# Patient Record
Sex: MALE | URBAN - METROPOLITAN AREA
[De-identification: names, ages, dates, MRNs, and addresses within clinical notes are randomized per-mention and may not be internally consistent; named-entity substitution may affect disease eponyms.]

---

## 2019-12-27 ENCOUNTER — RECORDS - HEALTHEAST (OUTPATIENT)
Dept: LAB | Facility: CLINIC | Age: 59
End: 2019-12-27

## 2019-12-27 LAB
ALBUMIN SERPL-MCNC: 3.8 G/DL (ref 3.5–5)
ALP SERPL-CCNC: 95 U/L (ref 45–120)
ALT SERPL W P-5'-P-CCNC: 27 U/L (ref 0–45)
ANION GAP SERPL CALCULATED.3IONS-SCNC: 8 MMOL/L (ref 5–18)
AST SERPL W P-5'-P-CCNC: 21 U/L (ref 0–40)
BASOPHILS # BLD AUTO: 0 THOU/UL (ref 0–0.2)
BASOPHILS NFR BLD AUTO: 1 % (ref 0–2)
BILIRUB SERPL-MCNC: 0.5 MG/DL (ref 0–1)
BUN SERPL-MCNC: 16 MG/DL (ref 8–22)
CALCIUM SERPL-MCNC: 9.3 MG/DL (ref 8.5–10.5)
CHLORIDE BLD-SCNC: 107 MMOL/L (ref 98–107)
CHOLEST SERPL-MCNC: 255 MG/DL
CO2 SERPL-SCNC: 25 MMOL/L (ref 22–31)
CREAT SERPL-MCNC: 0.98 MG/DL (ref 0.7–1.3)
EOSINOPHIL # BLD AUTO: 0.1 THOU/UL (ref 0–0.4)
EOSINOPHIL NFR BLD AUTO: 2 % (ref 0–6)
ERYTHROCYTE [DISTWIDTH] IN BLOOD BY AUTOMATED COUNT: 11.9 % (ref 11–14.5)
FASTING STATUS PATIENT QL REPORTED: ABNORMAL
GFR SERPL CREATININE-BSD FRML MDRD: >60 ML/MIN/1.73M2
GLUCOSE BLD-MCNC: 105 MG/DL (ref 70–125)
HCT VFR BLD AUTO: 50 % (ref 40–54)
HDLC SERPL-MCNC: 49 MG/DL
HGB BLD-MCNC: 16.2 G/DL (ref 14–18)
LDLC SERPL CALC-MCNC: 185 MG/DL
LYMPHOCYTES # BLD AUTO: 1.5 THOU/UL (ref 0.8–4.4)
LYMPHOCYTES NFR BLD AUTO: 30 % (ref 20–40)
MCH RBC QN AUTO: 31.5 PG (ref 27–34)
MCHC RBC AUTO-ENTMCNC: 32.4 G/DL (ref 32–36)
MCV RBC AUTO: 97 FL (ref 80–100)
MONOCYTES # BLD AUTO: 0.6 THOU/UL (ref 0–0.9)
MONOCYTES NFR BLD AUTO: 11 % (ref 2–10)
NEUTROPHILS # BLD AUTO: 2.8 THOU/UL (ref 2–7.7)
NEUTROPHILS NFR BLD AUTO: 56 % (ref 50–70)
PLATELET # BLD AUTO: 187 THOU/UL (ref 140–440)
PMV BLD AUTO: 11.2 FL (ref 8.5–12.5)
POTASSIUM BLD-SCNC: 4.4 MMOL/L (ref 3.5–5)
PROT SERPL-MCNC: 6.7 G/DL (ref 6–8)
RBC # BLD AUTO: 5.15 MILL/UL (ref 4.4–6.2)
SODIUM SERPL-SCNC: 140 MMOL/L (ref 136–145)
TRIGL SERPL-MCNC: 105 MG/DL
WBC: 5 THOU/UL (ref 4–11)

## 2020-09-15 ENCOUNTER — RECORDS - HEALTHEAST (OUTPATIENT)
Dept: LAB | Facility: CLINIC | Age: 60
End: 2020-09-15

## 2020-09-15 LAB
ALBUMIN SERPL-MCNC: 3.9 G/DL (ref 3.5–5)
ALP SERPL-CCNC: 85 U/L (ref 45–120)
ALT SERPL W P-5'-P-CCNC: 20 U/L (ref 0–45)
ANION GAP SERPL CALCULATED.3IONS-SCNC: 9 MMOL/L (ref 5–18)
AST SERPL W P-5'-P-CCNC: 16 U/L (ref 0–40)
BASOPHILS # BLD AUTO: 0 THOU/UL (ref 0–0.2)
BASOPHILS NFR BLD AUTO: 1 % (ref 0–2)
BILIRUB SERPL-MCNC: 0.4 MG/DL (ref 0–1)
BUN SERPL-MCNC: 17 MG/DL (ref 8–22)
CALCIUM SERPL-MCNC: 9.1 MG/DL (ref 8.5–10.5)
CHLORIDE BLD-SCNC: 107 MMOL/L (ref 98–107)
CHOLEST SERPL-MCNC: 215 MG/DL
CO2 SERPL-SCNC: 25 MMOL/L (ref 22–31)
CREAT SERPL-MCNC: 0.99 MG/DL (ref 0.7–1.3)
EOSINOPHIL # BLD AUTO: 0 THOU/UL (ref 0–0.4)
EOSINOPHIL NFR BLD AUTO: 1 % (ref 0–6)
ERYTHROCYTE [DISTWIDTH] IN BLOOD BY AUTOMATED COUNT: 11.9 % (ref 11–14.5)
FASTING STATUS PATIENT QL REPORTED: ABNORMAL
GFR SERPL CREATININE-BSD FRML MDRD: >60 ML/MIN/1.73M2
GLUCOSE BLD-MCNC: 99 MG/DL (ref 70–125)
HCT VFR BLD AUTO: 47.2 % (ref 40–54)
HDLC SERPL-MCNC: 47 MG/DL
HGB BLD-MCNC: 15.3 G/DL (ref 14–18)
IMM GRANULOCYTES # BLD: 0 THOU/UL
IMM GRANULOCYTES NFR BLD: 0 %
LDLC SERPL CALC-MCNC: 150 MG/DL
LYMPHOCYTES # BLD AUTO: 1.2 THOU/UL (ref 0.8–4.4)
LYMPHOCYTES NFR BLD AUTO: 30 % (ref 20–40)
MCH RBC QN AUTO: 31.7 PG (ref 27–34)
MCHC RBC AUTO-ENTMCNC: 32.4 G/DL (ref 32–36)
MCV RBC AUTO: 98 FL (ref 80–100)
MONOCYTES # BLD AUTO: 0.3 THOU/UL (ref 0–0.9)
MONOCYTES NFR BLD AUTO: 8 % (ref 2–10)
NEUTROPHILS # BLD AUTO: 2.5 THOU/UL (ref 2–7.7)
NEUTROPHILS NFR BLD AUTO: 61 % (ref 50–70)
PLATELET # BLD AUTO: 239 THOU/UL (ref 140–440)
PMV BLD AUTO: 11 FL (ref 8.5–12.5)
POTASSIUM BLD-SCNC: 4.2 MMOL/L (ref 3.5–5)
PROT SERPL-MCNC: 6.7 G/DL (ref 6–8)
RBC # BLD AUTO: 4.82 MILL/UL (ref 4.4–6.2)
SODIUM SERPL-SCNC: 141 MMOL/L (ref 136–145)
TRIGL SERPL-MCNC: 91 MG/DL
WBC: 4 THOU/UL (ref 4–11)

## 2023-03-23 ENCOUNTER — LAB REQUISITION (OUTPATIENT)
Dept: LAB | Facility: CLINIC | Age: 63
End: 2023-03-23

## 2023-03-23 DIAGNOSIS — Z13.220 ENCOUNTER FOR SCREENING FOR LIPOID DISORDERS: ICD-10-CM

## 2023-03-23 DIAGNOSIS — Z12.5 ENCOUNTER FOR SCREENING FOR MALIGNANT NEOPLASM OF PROSTATE: ICD-10-CM

## 2023-03-23 DIAGNOSIS — Z13.29 ENCOUNTER FOR SCREENING FOR OTHER SUSPECTED ENDOCRINE DISORDER: ICD-10-CM

## 2023-03-23 DIAGNOSIS — Z13.1 ENCOUNTER FOR SCREENING FOR DIABETES MELLITUS: ICD-10-CM

## 2023-03-23 LAB
ALBUMIN SERPL BCG-MCNC: 4.6 G/DL (ref 3.5–5.2)
ALP SERPL-CCNC: 87 U/L (ref 40–129)
ALT SERPL W P-5'-P-CCNC: 31 U/L (ref 10–50)
ANION GAP SERPL CALCULATED.3IONS-SCNC: 13 MMOL/L (ref 7–15)
AST SERPL W P-5'-P-CCNC: 24 U/L (ref 10–50)
BILIRUB SERPL-MCNC: 0.4 MG/DL
BUN SERPL-MCNC: 19.3 MG/DL (ref 8–23)
CALCIUM SERPL-MCNC: 9.4 MG/DL (ref 8.8–10.2)
CHLORIDE SERPL-SCNC: 104 MMOL/L (ref 98–107)
CHOLEST SERPL-MCNC: 240 MG/DL
CREAT SERPL-MCNC: 0.89 MG/DL (ref 0.67–1.17)
DEPRECATED HCO3 PLAS-SCNC: 23 MMOL/L (ref 22–29)
GFR SERPL CREATININE-BSD FRML MDRD: >90 ML/MIN/1.73M2
GLUCOSE SERPL-MCNC: 97 MG/DL (ref 70–99)
HDLC SERPL-MCNC: 64 MG/DL
LDLC SERPL CALC-MCNC: 161 MG/DL
NONHDLC SERPL-MCNC: 176 MG/DL
POTASSIUM SERPL-SCNC: 4.5 MMOL/L (ref 3.4–5.3)
PROT SERPL-MCNC: 7.1 G/DL (ref 6.4–8.3)
PSA SERPL DL<=0.01 NG/ML-MCNC: 5.04 NG/ML (ref 0–4.5)
SODIUM SERPL-SCNC: 140 MMOL/L (ref 136–145)
TRIGL SERPL-MCNC: 75 MG/DL
TSH SERPL DL<=0.005 MIU/L-ACNC: 1.74 UIU/ML (ref 0.3–4.2)

## 2023-03-23 PROCEDURE — 80053 COMPREHEN METABOLIC PANEL: CPT | Performed by: FAMILY MEDICINE

## 2023-03-23 PROCEDURE — 84443 ASSAY THYROID STIM HORMONE: CPT | Performed by: FAMILY MEDICINE

## 2023-03-23 PROCEDURE — G0103 PSA SCREENING: HCPCS | Performed by: FAMILY MEDICINE

## 2023-03-23 PROCEDURE — 80061 LIPID PANEL: CPT | Performed by: FAMILY MEDICINE

## 2023-11-17 ENCOUNTER — LAB REQUISITION (OUTPATIENT)
Dept: LAB | Facility: CLINIC | Age: 63
End: 2023-11-17

## 2023-11-17 DIAGNOSIS — R31.29 OTHER MICROSCOPIC HEMATURIA: ICD-10-CM

## 2023-11-17 LAB
ALBUMIN UR-MCNC: NEGATIVE MG/DL
APPEARANCE UR: CLEAR
BILIRUB UR QL STRIP: NEGATIVE
COLOR UR AUTO: ABNORMAL
GLUCOSE UR STRIP-MCNC: NEGATIVE MG/DL
HGB UR QL STRIP: NEGATIVE
KETONES UR STRIP-MCNC: NEGATIVE MG/DL
LEUKOCYTE ESTERASE UR QL STRIP: NEGATIVE
MUCOUS THREADS #/AREA URNS LPF: PRESENT /LPF
NITRATE UR QL: NEGATIVE
PH UR STRIP: 7 [PH] (ref 5–7)
RBC URINE: 0 /HPF
SP GR UR STRIP: 1.02 (ref 1–1.03)
UROBILINOGEN UR STRIP-MCNC: NORMAL MG/DL
WBC URINE: 0 /HPF

## 2023-11-17 PROCEDURE — 81001 URINALYSIS AUTO W/SCOPE: CPT | Performed by: FAMILY MEDICINE

## 2024-09-19 ENCOUNTER — TRANSFERRED RECORDS (OUTPATIENT)
Dept: HEALTH INFORMATION MANAGEMENT | Facility: CLINIC | Age: 64
End: 2024-09-19

## 2025-01-31 ENCOUNTER — MEDICAL CORRESPONDENCE (OUTPATIENT)
Dept: HEALTH INFORMATION MANAGEMENT | Facility: CLINIC | Age: 65
End: 2025-01-31
Payer: COMMERCIAL

## 2025-01-31 ENCOUNTER — TRANSFERRED RECORDS (OUTPATIENT)
Dept: HEALTH INFORMATION MANAGEMENT | Facility: CLINIC | Age: 65
End: 2025-01-31
Payer: COMMERCIAL

## 2025-02-03 ENCOUNTER — TELEPHONE (OUTPATIENT)
Dept: VASCULAR SURGERY | Facility: CLINIC | Age: 65
End: 2025-02-03
Payer: COMMERCIAL

## 2025-02-04 NOTE — TELEPHONE ENCOUNTER
Vascular Referral Intake    Appointment note (to be pasted into appt note. Also add where additional info is located ie: outside images pushed to PACS, in Epic, sent to HIM, etc):   Referred by Dr Troncoso for Varicose Veins    Specialty: Vein Clinic    Specific Provider if Necessary:  MD Emigdio Mckinnon    Visit Type: New    Time Frame: Next Available    Testing/Imaging Needed Before Consult: none    Elvia or bed needed: No    *Schedulers: Please send welcome letter to patient after appointment(s) have been scheduled*

## 2025-02-05 ENCOUNTER — ANCILLARY PROCEDURE (OUTPATIENT)
Dept: VASCULAR ULTRASOUND | Facility: CLINIC | Age: 65
End: 2025-02-05
Attending: SPECIALIST
Payer: COMMERCIAL

## 2025-02-05 ENCOUNTER — OFFICE VISIT (OUTPATIENT)
Dept: VASCULAR SURGERY | Facility: CLINIC | Age: 65
End: 2025-02-05
Attending: FAMILY MEDICINE
Payer: COMMERCIAL

## 2025-02-05 VITALS — OXYGEN SATURATION: 100 % | DIASTOLIC BLOOD PRESSURE: 80 MMHG | HEART RATE: 63 BPM | SYSTOLIC BLOOD PRESSURE: 180 MMHG

## 2025-02-05 DIAGNOSIS — I83.893 SYMPTOMATIC VARICOSE VEINS OF BOTH LOWER EXTREMITIES: ICD-10-CM

## 2025-02-05 PROCEDURE — 93970 EXTREMITY STUDY: CPT

## 2025-02-05 PROCEDURE — 99243 OFF/OP CNSLTJ NEW/EST LOW 30: CPT | Performed by: SPECIALIST

## 2025-02-05 PROCEDURE — G0463 HOSPITAL OUTPT CLINIC VISIT: HCPCS | Performed by: SPECIALIST

## 2025-02-05 RX ORDER — CELECOXIB 200 MG/1
200 CAPSULE ORAL 2 TIMES DAILY
COMMUNITY
Start: 2024-09-09

## 2025-02-05 RX ORDER — TADALAFIL 5 MG/1
1 TABLET ORAL DAILY
COMMUNITY

## 2025-02-05 ASSESSMENT — PAIN SCALES - GENERAL: PAINLEVEL_OUTOF10: NO PAIN (0)

## 2025-02-05 NOTE — PATIENT INSTRUCTIONS
Faisal    Thank you for entrusting your care with us at the [facility].      We are prescribing some compression stockings for you. I have included different suppliers that should help you get measured and fitting to ensure proper fitting socks. You should wear these stockings as much as you can. It is especially important to wear them with long periods of standing, sitting, long car rides or if you will be flying. Compression socks should get refilled every 4-6 months. They do not need to be worn at night while in bed. It is recommended to wear compression level of 20-30mmhg or higher from toes to knees.      We will perform an ultrasound today or prior to your appointment with us in 3 months time to evaluate the valves in your veins.      We would like you to follow up in 3 months after wearing socks to see how you are doing.        Varicose Veins    Varicose veins are twisted, enlarged veins near the surface of the skin. They develop most often in the legs and ankles.    Some people may be more likely than others to get varicose veins because of several things. These include aging, pregnancy, being overweight, or because a parent has them. Standing or sitting for long periods of time can also increase risk of varicose veins.    Follow-up care is a key part of your treatment and safety. Be sure to make and go to all appointments, and call your doctor if you are having problems. It's also a good idea to know your test results and keep a list of the medicines you take.      Varicose veins are caused by weakened valves and veins in your legs. Normally, one-way valves in your veins keep blood flowing from your legs up toward your heart. When these valves don't work as they should, blood collects in your legs, and pressure builds up. The veins become weak, large, and twisted.    How can you care for yourself at home?  Wear compression stockings during the day to help relieve symptoms and improve  blood flow. Talk to your doctor about which ones to get and where to get them.  Prop up your legs at or above the level of your heart when possible. Try to do this for about 30 minutes at a time, about 3 times a day. This helps keep the blood from pooling in your lower legs and improves blood flow to the rest of your body.  Avoid sitting and standing for long periods. This puts added stress on your veins.  Stay at a healthy weight. Lose weight if you need to.  Try to take several short walks every day.  Get at least 30 minutes of exercise on most days of the week. Walking is a good choice. You also may want to do other activities, such as running, swimming, cycling, or playing tennis or team sports.  Do calf muscle exercises every day. When you are sitting down, rotate your feet at the ankles in both directions, making small circles. Extend your legs, and point and flex your feet.  Avoid crossing your legs at the knees when sitting.  Take good care of your skin. Treat cuts and scrapes on your legs right away. Keep your legs clean and moisturized to prevent drying and cracking. Prevent sunburns.  Do not smoke. Smoking can make varicose veins worse. If you need help quitting, talk to your doctor about stop-smoking programs and medicines. These can increase your chances of quitting for good.  If you bump your leg so hard that you know it is likely to bruise, prop up your leg and apply ice or cold packs right away. Apply the ice or cold pack for 10 to 20 minutes, 3 or more times a day. Put a thin cloth between the ice and your skin.  If you cut or scratch the skin over a vein, it may bleed a lot. Prop up your leg and apply firm pressure for a full 15 minutes.  If you have a blood clot in a varicose vein, you may have tenderness and swelling over the vein. The vein may feel firm. Be sure to call your doctor right away if you have these symptoms. If your doctor has told you how to care for the clot, follow the  instructions.     Care may include the following:    Prop up your leg and apply a damp cloth that is warm or cool.  Ask your doctor if you can take an over-the-counter pain medicine, such as acetaminophen (Tylenol), ibuprofen (Advil, Motrin), or naproxen (Aleve). Be safe with medicines. Read and follow all instructions on the label.    When should you call for help?     Call 911 anytime you think you may need emergency care. For example, call if:    You have sudden chest pain and shortness of breath, or you cough up blood.    Call your doctor now or seek immediate medical care if:    You have signs of a blood clot in your leg (called a deep vein thrombosis), such as:  Pain in your calf, back of the knee, thigh, or groin.  Swelling in the leg or groin.  A color change on the leg or groin. The skin may be reddish or purplish, depending on your usual skin color.  A varicose vein begins to bleed and you cannot stop it.  You have a tender lump in your leg.  You get an open sore.    Watch closely for changes in your health, and be sure to contact your doctor if:    Your varicose vein symptoms do not improve with home treatment.    Current as of: December 19, 2022  Author: Healthwise Staff  Medical Review:Kian Rodriguez MD - Family Medicine & LAZARO Rodríguez MD - Internal Medicine & Jared Shah MD - Family Medicine & Hank Dinero MD - Family Medicine & Enrike Salazar MD - Diagnostic Radiology    Please bring your compression prescription to a home medical supply store. Here is a list of locations but not limited to.     [facility]  [address] [place] [address]  Phone: [phone]  Fax: [phone] [facility] Bldg.  [address] [place] [address]  Phone: [phone]  Fax: [phone]   [facility] Bldg.  [address] [place] [address]  Phone: [phone]  Fax: [phone] [facility]  [address] [place] [address]  Phone: [phone]  Fax: [phone]   [facility]  [address] [place] [address]  Phone: [phone] [facility]   [address] ([facility])  [place] [address]  Phone: [phone]   [facility]  [address] [place] [address]      Phone: [phone]  Fax: [phone] [place]  [address] [place] [address]      Phone: [phone]  Fax: [phone]     [facility] [email or web address]  [address], [place] [address]  [phone]    [facility]  [email or web address]  [address] [place] [address]  Phone: [phone]     [address] [place] [address]  Phone: [phone]    [address] [place] [address]  Phone: [phone] [address], [place] [address]  Phone: [phone] [address], [place] [address]  Phone: [phone]    [facility] [email or web address]  [address] [place] [address]  Phone: [phone]    [address], [place] [address]  Phone: [phone] [address], [place] [address]  Phone: [phone]    [facility]  [email or web address]  [phone]

## 2025-02-05 NOTE — PROGRESS NOTES
[facility]        Patient is here for a consult to discuss Varicose vein(s). The patient has varicose veins that are problematic in left legs. Patient states their varicose veins are bothersome when standing, sitting, working, and household chores.     Patient has been using pain medication or anti-inflammatory's. Patient has not had recent imaging on legs done. Patient has not done conservative measures which include: compression stockings, elevation, exercise, and weight loss. Treatment has been unsuccessful .     Educated patient to work on conservative treatments: compression stockings, elevation, exercise, and weight loss.      Pt is currently taking no meds that would impact our treatment plan.    There were no vitals taken for this visit.    The provider has been notified that the patient has no concerns.     Questions patient would like addressed today are: N/A.    Refills are needed: No    Has homecare services and agency name:  No

## 2025-02-05 NOTE — PROGRESS NOTES
"[facility] Vein Consult      Assessment:     1. {venous diagnosis:23829::\"varicose veins\",\"spider veins\",\"venous stasis ulcers\"}, {l/r/bi:85218}   2. {venous diagnosis:55210::\"varicose veins\",\"spider veins\",\"venous stasis ulcers\"}, {l/r/bi:36013}   3. Insuffiencey of {l/r/bi:83645} {Blank multiple:04212::\"greater saphenous vein\",\"short saphenous vein\",\"acessory saphenous vein\"},  {l/r/bi:76224} {Blank multiple:44435::\"greater saphenous vein\",\"short saphenous vein\",\"acessory saphenous vein\"}    Plan:     1. Treatment options of conservative therapy of stockings use, exercise, weight loss, elevating legs when possible.    2. Script for compression stockings 20-30 mm hg  3. Ultrasound to evaluate legs for incompetency of both deep and superficial system .   4. Surgical treatment   Endovenous closure of{l/r/bi:54591}, {Blank multiple:92352::\"greater saphenous vein\",\"short saphenous vein\",\"acessory saphenous vein\"}   Stab avulsions of {l/r/bi:90649} lower extremities   Laser treatment of {l/r/bi:98896}  spider veins     Risks and benefits of surgical intervention including infection, burns, dvt, thrombophlebitis, not closing, recurrence, numbness and nerve injury and need for further intervention were all discused    5. Follow up: {numbers; 0-10:24381} {units:11}.   6. Call for any questions concerns or issues    Subjective:      Faisal Whyte is a 64 year old male  who was referred by Deepak Troncoso  for evaluation of varicose veins. Symptoms include {sx varicose veins:575569}. Patient has history of leg swelling, pain and vein issues that have progressed. Pain and symptoms have affected daily living and work activities needing medications. Here for evaluation today. {Blank multiple:29199::\"Stocking use with compression stockings of 20-30 mm hg or greater for greater then 3 months\",\"Stocking use with compression stockings of 20-30 mm hg or greater for ***\",\"no stocking or compression devic " "use\"}    Allergies:Codeine and Erythromycin    History reviewed. No pertinent past medical history.    History reviewed. No pertinent surgical history.      Current Outpatient Medications:     celecoxib (CELEBREX) 200 MG capsule, Take 200 mg by mouth 2 times daily., Disp: , Rfl:     tadalafil (CIALIS) 5 MG tablet, Take 1 tablet by mouth daily., Disp: , Rfl:      History reviewed. No pertinent family history.     reports that he has never smoked. He has never used smokeless tobacco. He reports that he does not currently use alcohol. He reports that he does not use drugs.      Review of Systems:    {Ros - complete:00576}  Patient has symptomatic veins and changes of {l/r/bi:25454} legs. These have progressed to the point of causing symptoms on a daily basis. This causes issues with daily activities and chores such as {Blank multiple:90799::\"washing dishes\",\"vacuuming\",\"mowing lawn\",\"outdoor upkeep\",\"standing for long lengths of time\"}       Objective:     Vitals:    02/05/25 0749   BP: (!) 169/94   Pulse: 63   SpO2: 100%     There is no height or weight on file to calculate BMI.    EXAM:  GENERAL: This is a well-developed 64 year old male who appears his stated age  HEAD: normocephalic  HEENT: Pupils equal and reactive bilaterally  MOUTH: mucus membranes intact. Normal dentation  CARDIAC: RRR without murmur  CHEST/LUNG:  Clear to auscultation bilaterally  ABDOMEN: Soft, nontender, nondistended, no masses noted   NEUROLOGIC: Focally intact, nonfocal, alert and oriented x 3  INTEGUMENT: No open lesions or ulcers  VASCULAR: Pulses intact, symmetrical upper and lower extremities. {Blank multiple:34564::\"There are\",\"There are not\"}skin changes consistent with chronic venous insufficiency. Varicose veins present in {l/r/bi:39285} greater saphenous distribution. Spider veins {Blank multiple:49899::\"present\",\"absent\"} {l/r/bi:97718}.                      [facility] Surgery Consult    HPI:    64 year old year old male " "who I have been consulted by Deepak Troncoos for evaluation of Consult (scan all cards/id etc. /Referred by Dr Troncoso for Varicose Veins /)    ***    Allergies:Codeine and Erythromycin    History reviewed. No pertinent past medical history.    History reviewed. No pertinent surgical history.    CURRENT MEDS:  Current Outpatient Medications   Medication Sig Dispense Refill    celecoxib (CELEBREX) 200 MG capsule Take 200 mg by mouth 2 times daily.      tadalafil (CIALIS) 5 MG tablet Take 1 tablet by mouth daily.       No current facility-administered medications for this visit.       History reviewed. No pertinent family history.     reports that he has never smoked. He has never used smokeless tobacco. He reports that he does not currently use alcohol. He reports that he does not use drugs.    Review of Systems:  {ros master:644340}    OBJECTIVE:  Vitals:    02/05/25 0749   BP: (!) 169/94   Pulse: 63   SpO2: 100%     There is no height or weight on file to calculate BMI.    EXAM:  GENERAL: This is a well-developed 64 year old male who appears his stated age  HEAD: normocephalic  HEENT: Pupils equal and reactive bilaterally  MOUTH: mucus membranes intact  CARDIAC: RRR without murmur  CHEST/LUNG:  Clear to auscultation  ABDOMEN: Soft, nontender, nondistended, no masses  EXTREMITIES: Grossly normal, warm,   NEUROLOGIC: Focally intact, nonfocal  INTEGUMENT: No open lesions or ulcers  VASCULAR: Pulses intact, symmetrical upper and lower extremities.    {[facility] wound doc flow link:77825}    LABS:  Lab Results   Component Value Date    WBC 4.0 09/15/2020    HGB 15.3 09/15/2020    HCT 47.2 09/15/2020    MCV 98 09/15/2020     09/15/2020     INR/Prothrombin Time  @LABRCNTIP(NA,K,CL,co2,bun,creatinine,labglom,glucose,calcium)@  No results found for: \"HGBA1C\"  Lab Results   Component Value Date    ALT 31 03/23/2023    AST 24 03/23/2023    ALKPHOS 87 03/23/2023        Images: ***    Assessment/Plan:   [unfilled]    No " follow-ups on file.    Emigdio Mckinnon MD  General Surgery [phone]  Vascular Surgery [phone]          Side:: Left  VCSS  PAIN:: Mild: Occasional, not restricting activity of requiring pain medication  Varicose Veins:: Moderate: Multiple: great saphenous confined to calf and thigh  Venous Edema:: Absent: None  Skin Pigmentation:: Absent: None  Inflamation:: Absent: None  Induration:: Absent: None  Number of active ulcers:: 0  Active ulcer duration:: None  Active ulcer diameter:: None  Compression Therapy:: Not used or patient not compliant  VCSS Score:: 3  CEAP:: Skin pigmentation in the gaiter area (lipoderma tosclerosis)  Patient reported symptoms  Vein Appearance: Very noticeable  Heaviness: none of the time  Achiness: none of the time  Swelling: none of the time  Throbbing: none of the time  Itching: none of the time  Impact on work/activity: Mildly reduced work/activity    Imaging:      Emigdio Mckinnon MD  General Surgery [phone]  Vascular Surgery [phone]

## 2025-02-06 NOTE — PROGRESS NOTES
[facility] Vein Consult      Assessment:     1. varicose veins,  bilateral left greater then right    2. spider veins, bilateral   3. Insuffiencey of left greater saphenous vein,       Plan:     1. Treatment options of conservative therapy of stockings use, exercise, weight loss, elevating legs when possible.    2. Script for compression stockings 20-30 mm hg  3. Ultrasound to evaluate legs for incompetency of both deep and superficial system .   4. Surgical treatment, discussed briefly today.   5. Follow up: 3 months.   6. Call for any questions concerns or issues    Subjective:      Faisal Whyte is a 64 year old male  who was referred by Deepak Troncoso  for evaluation of varicose veins. Symptoms include pain, aching, fatigue, burning, edema, and dermatitis. Patient has history of leg swelling, pain and vein issues that have progressed. Pain and symptoms have affected daily living and work activities needing medications. Here for evaluation today. no stocking or compression devic use    Allergies:Codeine and Erythromycin    History reviewed. No pertinent past medical history.    History reviewed. No pertinent surgical history.      Current Outpatient Medications:     celecoxib (CELEBREX) 200 MG capsule, Take 200 mg by mouth 2 times daily., Disp: , Rfl:     tadalafil (CIALIS) 5 MG tablet, Take 1 tablet by mouth daily., Disp: , Rfl:      History reviewed. No pertinent family history.     reports that he has never smoked. He has never used smokeless tobacco. He reports that he does not currently use alcohol. He reports that he does not use drugs.      Review of Systems:    Pertinent items are noted in HPI.  Patient has symptomatic veins and changes of bilateral legs. These have progressed to the point of causing symptoms on a daily basis. This causes issues with daily activities and chores such as mowing lawn, outdoor upkeep, and standing for long lengths of time.  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  This very has problems or issues after if you are standing.       Objective:     Vitals:    02/05/25 0749 02/05/25 0909   BP: (!) 169/94 (!) 180/80   Pulse: 63    SpO2: 100%      There is no height or weight on file to calculate BMI.    EXAM:  GENERAL: This is a well-developed 64 year old male who appears his stated age  HEAD: normocephalic  HEENT: Pupils equal and reactive bilaterally  MOUTH: mucus membranes intact. Normal dentation  CARDIAC: RRR without murmur  CHEST/LUNG:  Clear to auscultation bilaterally  ABDOMEN: Soft, nontender, nondistended, no masses noted   NEUROLOGIC: Focally intact, nonfocal, alert and oriented x 3  INTEGUMENT: No open lesions or ulcers  VASCULAR: Pulses intact, symmetrical upper and lower extremities. There areskin changes consistent with chronic venous insufficiency. Varicose veins present in bilateral greater saphenous distribution. Spider veins present bilateral.                        Side:: Left  VCSS  PAIN:: Mild: Occasional, not restricting activity of requiring pain medication  Varicose Veins:: Moderate: Multiple: great saphenous confined to calf and thigh  Venous Edema:: Absent: None  Skin Pigmentation:: Absent: None  Inflamation:: Absent: None  Induration:: Absent: None  Number of active ulcers:: 0  Active ulcer duration:: None  Active ulcer diameter:: None  Compression Therapy:: Not used or patient not compliant  VCSS Score:: 3  CEAP:: Skin pigmentation in the gaiter area (lipoderma tosclerosis)  Patient reported symptoms  Vein Appearance: Very noticeable  Heaviness: none of the time  Achiness: none of the time  Swelling: none of the time  Throbbing: none of the time  Itching: none of the time  Impact on work/activity: Mildly reduced work/activity    Imaging:          Exam Information    Exam Date Exam Time Accession # Performing Department Results    2/5/25  9:01 AM [id number] [facility] [place]      PACS  Images     Show images for US Venous Competency Bilateral     Study Result    Narrative & Impression   BILATERAL Venous Insufficiency Ultrasound (Date: 02/05/25)    BILATERAL Lower Extremity          Indication: Symptomatic varicose veins/leg pain/swelling     Previous: None     Patient History: Swelling and Stasis     Presenting Symptoms:  Swelling, Varicose Veins, Pain, and Stasis     Technique:   Supine and Reverse Trendelenburg Ultrasound of the Deep and Superficial Veins with Valsalva and Compression Augmentation Maneuvers. Duplex Imaging is performed utilizing gray-scale, Two-dimensional images, color-flow imaging, Doppler waveform analysis, and Spectral doppler imaging done with provacative maneuvers.      Incompetency Criteria:  Deep vein reflux reported when greater than 1000 msec flow reversal. Superficial vein reflux reported when greater than 500 msec flow reversal.  vein reflux reported as greater than 350 msec flow reversal.      Right  Leg Deep Veins    CFV SFJ DFV PROX FV   PROX FV MID FV DIST POP V. PERON.   V. PTV'S   Compressibility  (FC,PC,NC) FC FC FC FC FC FC FC FC FC   Reflux +   - + - - -   -         Right Leg Superficial Veins  Location SFJ PROX THIGH MID THIGH KNEE MID CALF   GSV (mm) 10 4 4 4 3   Reflux - - + + -   AASV (mm) 3           Reflux -           PASV (mm) 2           Reflux -              Location SPJ PROX CALF MID CALF   SSV (mm) 2 2 3   Reflux - - -      Left  Leg Deep Veins    CFV SFJ DFV PROX FV   PROX FV MID FV DIST POP V. PERON.   V. PTV'S   Compressibility  (FC,PC,NC) FC FC FC FC FC FC FC FC FC   Reflux +   + + + + +   -         Left Leg Superficial Veins  Location SFJ PROX THIGH MID THIGH KNEE MID CALF   GSV (mm) 8 6 6 7 2   Reflux + + + + -   AASV (mm) 3           Reflux -           PASV (mm) 3           Reflux -              Location SPJ PROX CALF MID CALF   SSV (mm) 5 5 3   Reflux - - -      Comments: No incompetent  veins visualized.     Left GSV  is straight enough to ablate.     Impression:       Right Deep Vein Findings: Patent deep venous system with no evidence of DVT and reflux in the common femoral and proximal femoral veins     Left Deep Vein Findings: Patent deep venous system with no evidence of DVT and reflux from the common femoral to the popliteal veins     Superficial Vein Findings:      Right Greater Saphenous Vein: Patent Greater Saphenous vein with Reflux noted at the mid thigh and knee with a Maximum diameter of 10 mm .     Right Small Saphenous Vein: Patent Small Saphenous Vein without evidence of reflux.     Left Greater Saphenous Vein: Patent Greater Saphenous vein with Reflux noted from the saphenofemoral junction to the knee with a Maximum diameter of 8 mm .     Left Small Saphenous Vein: Patent Small Saphenous Vein without evidence of reflux.     Perforating and Accessory Veins: N/A     Reference:     Compressibility: FC= Fully compressible, PC= Partially compressible, NC= Non-compressible, NV= Not Visualized     Reflux: (+) Incompetent  (-) Competent, (NV) = Not Visualized     Interpretation criteria:          Duration of Retrograde flow (milliseconds)  Category Deep Veins Superficial Veins  veins   Competent < 1000ms < 500ms < 350ms   Incompetent > 1000ms > 500ms > 350ms           Emigdio Mckinnon MD  General Surgery [phone]  Vascular Surgery [phone]